# Patient Record
Sex: FEMALE
[De-identification: names, ages, dates, MRNs, and addresses within clinical notes are randomized per-mention and may not be internally consistent; named-entity substitution may affect disease eponyms.]

---

## 2019-05-15 PROBLEM — Z00.00 ENCOUNTER FOR PREVENTIVE HEALTH EXAMINATION: Status: ACTIVE | Noted: 2019-05-15

## 2019-05-21 ENCOUNTER — APPOINTMENT (OUTPATIENT)
Dept: OBGYN | Facility: CLINIC | Age: 24
End: 2019-05-21

## 2019-09-26 ENCOUNTER — FORM ENCOUNTER (OUTPATIENT)
Age: 24
End: 2019-09-26

## 2019-09-27 ENCOUNTER — FORM ENCOUNTER (OUTPATIENT)
Age: 24
End: 2019-09-27

## 2019-09-27 ENCOUNTER — APPOINTMENT (OUTPATIENT)
Dept: OBGYN | Facility: CLINIC | Age: 24
End: 2019-09-27
Payer: COMMERCIAL

## 2019-09-27 PROCEDURE — 99202 OFFICE O/P NEW SF 15 MIN: CPT

## 2019-09-30 ENCOUNTER — FORM ENCOUNTER (OUTPATIENT)
Age: 24
End: 2019-09-30

## 2020-03-31 ENCOUNTER — APPOINTMENT (OUTPATIENT)
Dept: OBGYN | Facility: CLINIC | Age: 25
End: 2020-03-31

## 2021-03-15 ENCOUNTER — FORM ENCOUNTER (OUTPATIENT)
Age: 26
End: 2021-03-15

## 2021-03-16 ENCOUNTER — TRANSCRIPTION ENCOUNTER (OUTPATIENT)
Age: 26
End: 2021-03-16

## 2021-03-16 ENCOUNTER — APPOINTMENT (OUTPATIENT)
Dept: OBGYN | Facility: CLINIC | Age: 26
End: 2021-03-16
Payer: COMMERCIAL

## 2021-03-16 PROCEDURE — 99072 ADDL SUPL MATRL&STAF TM PHE: CPT

## 2021-03-16 PROCEDURE — 99395 PREV VISIT EST AGE 18-39: CPT

## 2021-03-18 ENCOUNTER — FORM ENCOUNTER (OUTPATIENT)
Age: 26
End: 2021-03-18

## 2021-06-06 ENCOUNTER — FORM ENCOUNTER (OUTPATIENT)
Age: 26
End: 2021-06-06

## 2021-11-03 ENCOUNTER — APPOINTMENT (OUTPATIENT)
Dept: OBGYN | Facility: CLINIC | Age: 26
End: 2021-11-03
Payer: COMMERCIAL

## 2021-11-03 VITALS
SYSTOLIC BLOOD PRESSURE: 125 MMHG | DIASTOLIC BLOOD PRESSURE: 82 MMHG | HEART RATE: 66 BPM | BODY MASS INDEX: 25.83 KG/M2 | RESPIRATION RATE: 18 BRPM | OXYGEN SATURATION: 98 % | HEIGHT: 65 IN | WEIGHT: 155 LBS

## 2021-11-03 DIAGNOSIS — N75.0 CYST OF BARTHOLIN'S GLAND: ICD-10-CM

## 2021-11-03 PROCEDURE — 99212 OFFICE O/P EST SF 10 MIN: CPT

## 2021-11-04 PROBLEM — N75.0 CYST OF RIGHT BARTHOLIN'S GLAND: Status: ACTIVE | Noted: 2021-11-04

## 2021-11-04 NOTE — PHYSICAL EXAM
[Appropriately responsive] : appropriately responsive [Alert] : alert [No Acute Distress] : no acute distress [Clear to Auscultation B/L] : clear to auscultation bilaterally [Soft] : soft [Non-tender] : non-tender [Non-distended] : non-distended [No HSM] : No HSM [No Lesions] : no lesions [No Mass] : no mass [Oriented x3] : oriented x3 [Labia Majora] : normal [Labia Minora] : normal [Bartholin's Gland] : both Bartholin's glands were normal  [___] : no abscess [] : no inflammation [Normal] : normal [Normal Position] : in a normal position [Tenderness] : nontender [Uterine Adnexae] : normal [Declined] : Patient declined rectal exam

## 2021-11-04 NOTE — REVIEW OF SYSTEMS
[Patient Intake Form Reviewed] : Patient intake form was reviewed [Pelvic pain] : pelvic pain [Genital Rash/Irritation] : genital rash/irritation [Negative] : Heme/Lymph [FreeTextEntry8] : reports ruptured cyst almost golf ball in size monday-woke her up from her sleep-serosanginous drainage.

## 2021-11-04 NOTE — PLAN
[FreeTextEntry1] : Ruptured Bartholins gland\par -d/w pt if this becomes recurrent can place cath or marsupialization\par -care if new s/s return. warm sitz baths with epsom salt, cold compresses and ibuprofen/tylenol prn for discomfort. \par -nl exam today; pt reassured and advised to call with new or worsening s/s.

## 2021-11-04 NOTE — COUNSELING
[HPV Vaccine] : HPV Vaccine [Lab Results] : lab results [Medication Management] : medication management [Other ___] : [unfilled]

## 2021-11-04 NOTE — HISTORY OF PRESENT ILLNESS
[Rt Vulva] : right vulva [Localized] : localized [Warm Compress] : warm compress [FreeTextEntry1] : 25 y/o g0 presents for follow up about a vaginal cysts that ruptured on its own on Monday. No complaints today; no pain ,burning or discharge. Noticed area of discomfort last week on right labia majora; most likely by location and description inflammed Bartholins gland. \par \par No other complaints; otherise doing well. \par \par No aub, pelvic pain, vaginal discharge, n/v/f/c or urinary symptoms. 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.  [TextBox_4] : none

## 2021-11-18 ENCOUNTER — NON-APPOINTMENT (OUTPATIENT)
Age: 26
End: 2021-11-18

## 2021-11-18 DIAGNOSIS — B96.89 ACUTE VAGINITIS: ICD-10-CM

## 2021-11-18 DIAGNOSIS — Z78.9 OTHER SPECIFIED HEALTH STATUS: ICD-10-CM

## 2021-11-18 DIAGNOSIS — N76.1 SUBACUTE AND CHRONIC VAGINITIS: ICD-10-CM

## 2021-11-18 DIAGNOSIS — Z82.49 FAMILY HISTORY OF ISCHEMIC HEART DISEASE AND OTHER DISEASES OF THE CIRCULATORY SYSTEM: ICD-10-CM

## 2021-11-18 DIAGNOSIS — Z80.3 FAMILY HISTORY OF MALIGNANT NEOPLASM OF BREAST: ICD-10-CM

## 2021-11-18 DIAGNOSIS — N76.0 ACUTE VAGINITIS: ICD-10-CM

## 2021-11-18 DIAGNOSIS — K08.409 PARTIAL LOSS OF TEETH, UNSPECIFIED CAUSE, UNSPECIFIED CLASS: ICD-10-CM

## 2021-11-18 DIAGNOSIS — Z01.419 ENCOUNTER FOR GYNECOLOGICAL EXAMINATION (GENERAL) (ROUTINE) W/OUT ABNORMAL FINDINGS: ICD-10-CM

## 2021-12-23 ENCOUNTER — TRANSCRIPTION ENCOUNTER (OUTPATIENT)
Age: 26
End: 2021-12-23

## 2022-03-30 ENCOUNTER — APPOINTMENT (OUTPATIENT)
Dept: OBGYN | Facility: CLINIC | Age: 27
End: 2022-03-30
Payer: COMMERCIAL

## 2022-03-30 VITALS
WEIGHT: 157 LBS | HEIGHT: 65 IN | OXYGEN SATURATION: 100 % | DIASTOLIC BLOOD PRESSURE: 84 MMHG | SYSTOLIC BLOOD PRESSURE: 135 MMHG | RESPIRATION RATE: 16 BRPM | HEART RATE: 72 BPM | BODY MASS INDEX: 26.16 KG/M2

## 2022-03-30 DIAGNOSIS — Z12.4 ENCOUNTER FOR SCREENING FOR MALIGNANT NEOPLASM OF CERVIX: ICD-10-CM

## 2022-03-30 DIAGNOSIS — Z31.69 ENCOUNTER FOR OTHER GENERAL COUNSELING AND ADVICE ON PROCREATION: ICD-10-CM

## 2022-03-30 PROCEDURE — 99395 PREV VISIT EST AGE 18-39: CPT

## 2022-03-30 RX ORDER — CHLORHEXIDINE GLUCONATE 4 %
400 LIQUID (ML) TOPICAL DAILY
Qty: 30 | Refills: 3 | Status: ACTIVE | COMMUNITY
Start: 2022-03-30 | End: 1900-01-01

## 2022-03-30 NOTE — PLAN
[FreeTextEntry1] : 25 YO presents for an annual wellness exam.\par - Rx for foic acid 400mcg po daily; advised to start at least 3mo. prior to ttc\par - SEMA 4 carrier screening information provided; deciding of she would \par - Discussed and reviewed importance of monthly BSE\par - Declines STI testing, importance safe sexual practices discussed\par - PAP and GC/CT test collected and sent at todays visit\par - f/u PCP for recommended HCM, vaccinations and CA screening.

## 2022-03-30 NOTE — PHYSICAL EXAM
[Appropriately responsive] : appropriately responsive [Alert] : alert [No Acute Distress] : no acute distress [Soft] : soft [Non-tender] : non-tender [Non-distended] : non-distended [No HSM] : No HSM [No Lesions] : no lesions [No Mass] : no mass [Oriented x3] : oriented x3 [Examination Of The Breasts] : a normal appearance [No Masses] : no breast masses were palpable [Labia Majora] : normal [Labia Minora] : normal [Normal] : normal [Uterine Adnexae] : normal [Chaperone Declined] : Patient declined chaperone [Declined] : Patient declined rectal exam [FreeTextEntry8] : no cmt/no adnexal masses

## 2022-03-30 NOTE — HISTORY OF PRESENT ILLNESS
[Currently Active] : currently active [Condoms] : Condoms [Men] : men [No] : No [Patient refuses STI testing] : Patient refuses STI testing [FreeTextEntry1] : 27 YO g0 presents for an annual wellness exam. Pt last seen by CB for a ruptured Bartholin cyst 11/2021 healing well Pt was last seen by Chandni 3/16/2021 and had a negative PAP. Pt has hx of recurrent BV. Pt received the Gardasil vaccine. Pt uses condoms, sexually active,  almost 2 yrs. Pt is planning to start trying to conceive next year.  LMP 3/11/2022. Reports monthly menses; no aub/pelvic pain/breast complaints. 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  [FreeTextEntry2] :

## 2022-03-30 NOTE — COUNSELING
[Nutrition/ Exercise/ Weight Management] : nutrition, exercise, weight management [Vitamins/Supplements] : vitamins/supplements [Breast Self Exam] : breast self exam [Contraception/ Emergency Contraception/ Safe Sexual Practices] : contraception, emergency contraception, safe sexual practices [Preconception Care/ Fertility options] : preconception care, fertility options [Pregnancy Options] : pregnancy options [STD (testing, results, tx)] : STD (testing, results, tx) [Lab Results] : lab results [Medication Management] : medication management [Other ___] : [unfilled] [FreeTextEntry2] : carrier screen testing information/erx for folic acid/using opks/calendar to track cycles

## 2022-03-31 LAB
C TRACH RRNA SPEC QL NAA+PROBE: NOT DETECTED
N GONORRHOEA RRNA SPEC QL NAA+PROBE: NOT DETECTED
SOURCE AMPLIFICATION: NORMAL

## 2022-04-05 LAB — CYTOLOGY CVX/VAG DOC THIN PREP: NORMAL
